# Patient Record
Sex: FEMALE | Race: WHITE | NOT HISPANIC OR LATINO | URBAN - METROPOLITAN AREA
[De-identification: names, ages, dates, MRNs, and addresses within clinical notes are randomized per-mention and may not be internally consistent; named-entity substitution may affect disease eponyms.]

---

## 2018-06-07 ENCOUNTER — OFFICE VISIT (OUTPATIENT)
Dept: URBAN - METROPOLITAN AREA CLINIC 23 | Facility: CLINIC | Age: 68
End: 2018-06-07
Payer: MEDICARE

## 2018-06-07 DIAGNOSIS — H43.392 OTHER VITREOUS OPACITIES, LEFT EYE: ICD-10-CM

## 2018-06-07 DIAGNOSIS — H43.811 VITREOUS DEGENERATION, RIGHT EYE: Primary | ICD-10-CM

## 2018-06-07 PROCEDURE — 92012 INTRM OPH EXAM EST PATIENT: CPT | Performed by: OPTOMETRIST

## 2018-06-07 ASSESSMENT — INTRAOCULAR PRESSURE
OD: 22
OS: 20

## 2018-06-07 NOTE — IMPRESSION/PLAN
Impression: Other vitreous opacities, left eye: H43.392. Plan: Discussed diagnosis in detail with patient. Advised patient of condition. No treatment is required at this time. Discussed signs and symptoms of PVD/floaters. Discussed signs and symptoms of retinal detachment. Reassured patient of current condition and treatment. Patient instructed to call if condition gets worse. Will continue to observe.

## 2018-06-07 NOTE — IMPRESSION/PLAN
Impression: Vitreous degeneration, right eye: H43.811. Plan: Discussed diagnosis in detail with patient. Posterior vitreous detachment accounts for the patient's complaints. There is no evidence of associated retinal pathology. All signs and risks of retinal detachment and tears were discussed. Patient instructed to call the office immediately if symptoms worsen. Will continue to observe.

## 2019-06-12 ENCOUNTER — OFFICE VISIT (OUTPATIENT)
Dept: URBAN - METROPOLITAN AREA CLINIC 23 | Facility: CLINIC | Age: 69
End: 2019-06-12
Payer: MEDICARE

## 2019-06-12 DIAGNOSIS — H16.223 KERATOCONJUNCT SICCA, NOT SPECIFIED AS SJOGREN'S, BILATERAL: ICD-10-CM

## 2019-06-12 PROCEDURE — 92014 COMPRE OPH EXAM EST PT 1/>: CPT | Performed by: OPTOMETRIST

## 2019-06-12 ASSESSMENT — INTRAOCULAR PRESSURE
OD: 20
OS: 20

## 2019-06-12 ASSESSMENT — KERATOMETRY
OS: 45.00
OD: 44.50

## 2019-06-12 NOTE — IMPRESSION/PLAN
Impression: Vitreous degeneration, bilateral: H43.813. Plan: Discussed diagnosis in detail with patient. Reassured patient of current condition and treatment. Will continue to observe condition and or symptoms. Discussed signs and symptoms of retinal detachment. Discussed signs and symptoms of PVD/floaters.

## 2019-06-12 NOTE — IMPRESSION/PLAN
Impression: Keratoconjunct sicca, not specified as Sjogren's, bilateral: F64.064. Plan: Discussed diagnosis in detail with patient. Reassured patient of dry eyes and treatment. Will continue to observe condition/symptoms. Patient instructed to use ATs QID OU and Genteal gel QHS OU. Patient to call if symptoms progress. Sample of ATs given today.

## 2019-10-24 ENCOUNTER — TESTING ONLY (OUTPATIENT)
Dept: URBAN - METROPOLITAN AREA CLINIC 23 | Facility: CLINIC | Age: 69
End: 2019-10-24

## 2019-10-24 DIAGNOSIS — H52.223 REGULAR ASTIGMATISM, BILATERAL: Primary | ICD-10-CM

## 2019-10-24 ASSESSMENT — VISUAL ACUITY
OS: 20/25
OD: 20/25

## 2020-02-07 ENCOUNTER — NEW PATIENT (OUTPATIENT)
Dept: URBAN - METROPOLITAN AREA CLINIC 24 | Facility: CLINIC | Age: 70
End: 2020-02-07
Payer: MEDICARE

## 2020-02-07 DIAGNOSIS — H25.13 AGE-RELATED NUCLEAR CATARACT, BILATERAL: ICD-10-CM

## 2020-02-07 DIAGNOSIS — H40.013 OPEN ANGLE WITH BORDERLINE FINDINGS, LOW RISK, BILATERAL: ICD-10-CM

## 2020-02-07 DIAGNOSIS — H43.813 VITREOUS DEGENERATION, BILATERAL: ICD-10-CM

## 2020-02-07 PROCEDURE — 99204 OFFICE O/P NEW MOD 45 MIN: CPT | Performed by: OPTOMETRIST

## 2020-02-07 ASSESSMENT — INTRAOCULAR PRESSURE
OS: 16
OD: 16

## 2020-02-07 ASSESSMENT — KERATOMETRY
OD: 44.62
OS: 44.73

## 2020-02-07 ASSESSMENT — VISUAL ACUITY
OD: 20/20
OS: 20/20

## 2020-08-04 ENCOUNTER — OFFICE VISIT (OUTPATIENT)
Dept: URBAN - METROPOLITAN AREA CLINIC 23 | Facility: CLINIC | Age: 70
End: 2020-08-04
Payer: MEDICARE

## 2020-08-04 DIAGNOSIS — H43.393 OTHER VITREOUS OPACITIES, BILATERAL: ICD-10-CM

## 2020-08-04 DIAGNOSIS — G43.109 MIGRAINE WITH AURA, NOT INTRACTABLE, WITHOUT STATUS MIGRAINOSUS: ICD-10-CM

## 2020-08-04 DIAGNOSIS — H16.9 KERATITIS: ICD-10-CM

## 2020-08-04 PROCEDURE — 92014 COMPRE OPH EXAM EST PT 1/>: CPT | Performed by: OPTOMETRIST

## 2020-08-04 PROCEDURE — 92134 CPTRZ OPH DX IMG PST SGM RTA: CPT | Performed by: OPTOMETRIST

## 2020-08-04 ASSESSMENT — KERATOMETRY
OD: 44.38
OS: 45.00

## 2020-08-04 ASSESSMENT — INTRAOCULAR PRESSURE
OS: 18
OD: 18

## 2020-08-04 NOTE — IMPRESSION/PLAN
Impression: Migraine with aura, not intractable, without status migrainosus: G43.109. Plan: Discussed diagnosis in detail with patient. No treatment is required at this time. Reassured patient of current condition and treatment. Will continue to observe condition and or symptoms. Suggested consult with PCP for choroid doppler workup if conditions persists.

## 2020-10-14 ENCOUNTER — OFFICE VISIT (OUTPATIENT)
Dept: URBAN - METROPOLITAN AREA CLINIC 23 | Facility: CLINIC | Age: 70
End: 2020-10-14
Payer: MEDICARE

## 2020-10-14 PROCEDURE — 99213 OFFICE O/P EST LOW 20 MIN: CPT | Performed by: OPTOMETRIST

## 2020-10-14 RX ORDER — LEVOTHYROXINE SODIUM 100 UG/1
CAPSULE ORAL
Qty: 0 | Refills: 0 | Status: ACTIVE
Start: 2020-10-14

## 2020-10-14 ASSESSMENT — INTRAOCULAR PRESSURE
OD: 22
OS: 19

## 2020-10-14 NOTE — IMPRESSION/PLAN
Impression: Keratoconjunct sicca, not specified as Sjogren's, bilateral: T93.978. Plan: Discussed diagnosis in detail with patient. Reassured patient of dry eyes and treatment. Will continue to observe condition/symptoms. Patient instructed to use  PF ATs QID OU and Genteal gel QHS OU. Patient to call if symptoms progress.

## 2024-04-17 NOTE — IMPRESSION/PLAN
Impression: Keratitis: H16.9. Plan: Discussed diagnosis in detail with patient. Reassured patient of dry eyes and treatment. Will continue to observe condition/symptoms. Patient instructed to use ATs QID OU and Genteal gel QHS OU x 3 months. Patient to call if symptoms progress. Please offer sooner appointment in the office